# Patient Record
Sex: FEMALE | Race: AMERICAN INDIAN OR ALASKA NATIVE
[De-identification: names, ages, dates, MRNs, and addresses within clinical notes are randomized per-mention and may not be internally consistent; named-entity substitution may affect disease eponyms.]

---

## 2018-05-17 ENCOUNTER — HOSPITAL ENCOUNTER (EMERGENCY)
Dept: HOSPITAL 5 - ED | Age: 38
Discharge: HOME | End: 2018-05-17
Payer: MEDICAID

## 2018-05-17 VITALS — SYSTOLIC BLOOD PRESSURE: 118 MMHG | DIASTOLIC BLOOD PRESSURE: 72 MMHG
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
## 2018-05-17 DIAGNOSIS — S46.911A: Primary | ICD-10-CM

## 2018-05-17 PROCEDURE — 96372 THER/PROPH/DIAG INJ SC/IM: CPT

## 2018-05-17 PROCEDURE — 73030 X-RAY EXAM OF SHOULDER: CPT

## 2018-05-17 PROCEDURE — 99283 EMERGENCY DEPT VISIT LOW MDM: CPT

## 2018-05-17 PROCEDURE — 29105 APPLICATION LONG ARM SPLINT: CPT

## 2018-05-17 NOTE — EMERGENCY DEPARTMENT REPORT
ED Upper Extremity Inj HPI





- General


Chief Complaint: Shoulder Injury


Stated Complaint: C/O DISLOCATED SHOULDER


Time Seen by Provider: 05/17/18 20:06


Source: patient


Mode of arrival: Ambulatory


Limitations: No Limitations





- History of Present Illness


Initial Comments: 





This is a 38-year-old female nontoxic, well nourished in appearance, no acute 

signs of distress presents to the ED with c/o of right shoulder pain.  Patient 

stated that she was doing yoga and was stretching that caused the pain 

immediately.  Patient denies any trauma to the shoulder. Patient denies any 

joint redness, joint swelling, fever, chills, nausea, vomiting, chest pain or 

shortness breath.  Patient denies abnormal or decreased gait.  Patient denies 

any allergies or PMH.


MD Complaint: Injury to:: right, shoulder


-: days(s) (1)


Other Extremity Injury: Shoulder: Right


Other Injuries: none


Severity scale (0 -10): 8


Improves With: immobilization


Worsens With: movement of extremity


Associated Symptoms: denies other symptoms.  denies: weakness, numbness, neck 

pain, suspects foreign body, nausea/vomiting, heard/felt popping sensat





- Related Data


 Previous Rx's











 Medication  Instructions  Recorded  Last Taken  Type


 


Cyclobenzaprine [Flexeril] 10 mg PO QHS PRN #7 tablet 05/17/18 Unknown Rx


 


Ibuprofen [Motrin] 600 mg PO Q8H PRN #30 tablet 05/17/18 Unknown Rx











 Allergies











Allergy/AdvReac Type Severity Reaction Status Date / Time


 


No Known Allergies Allergy   Unverified 01/07/15 16:00














ED Review of Systems


ROS: 


Stated complaint: C/O DISLOCATED SHOULDER


Other details as noted in HPI





Constitutional: denies: chills, fever


Eyes: denies: eye pain, eye discharge, vision change


ENT: denies: ear pain, throat pain


Respiratory: denies: cough, shortness of breath, wheezing


Cardiovascular: denies: chest pain, palpitations


Endocrine: no symptoms reported


Gastrointestinal: denies: abdominal pain, nausea, diarrhea


Genitourinary: denies: urgency, dysuria, discharge


Musculoskeletal: arthralgia.  denies: back pain, joint swelling


Skin: denies: rash, lesions


Neurological: denies: headache, weakness, paresthesias


Psychiatric: denies: anxiety, depression


Hematological/Lymphatic: denies: easy bleeding, easy bruising





ED Past Medical Hx





- Past Medical History


Previous Medical History?: No





- Surgical History


Past Surgical History?: Yes


Hx Breast Surgery: Yes (augmentation)





- Social History


Smoking Status: Never Smoker


Substance Use Type: None





- Medications


Home Medications: 


 Home Medications











 Medication  Instructions  Recorded  Confirmed  Last Taken  Type


 


Cyclobenzaprine [Flexeril] 10 mg PO QHS PRN #7 tablet 05/17/18  Unknown Rx


 


Ibuprofen [Motrin] 600 mg PO Q8H PRN #30 tablet 05/17/18  Unknown Rx














ED Physical Exam





- General


Limitations: No Limitations


General appearance: alert, in no apparent distress





- Head


Head exam: Present: atraumatic, normocephalic





- Eye


Eye exam: Present: normal appearance


Pupils: Present: normal accommodation





- ENT


ENT exam: Present: normal exam, mucous membranes moist





- Neck


Neck exam: Present: normal inspection, full ROM.  Absent: tenderness, 

meningismus, lymphadenopathy





- Respiratory


Respiratory exam: Present: normal lung sounds bilaterally.  Absent: respiratory 

distress, wheezes, rales, rhonchi, stridor





- Cardiovascular


Cardiovascular Exam: Present: regular rate, normal rhythm, normal heart sounds.

  Absent: irregular rhythm, systolic murmur, diastolic murmur, rubs, gallop





- GI/Abdominal


GI/Abdominal exam: Present: soft, normal bowel sounds





- Extremities Exam


Extremities exam: Present: normal inspection, full ROM, tenderness, normal 

capillary refill.  Absent: joint swelling





- Expanded Upper Extremity Exam


  ** Right


General: Present: normal inspection


Shoulder Exam: Present: normal inspection, full ROM, tenderness.  Absent: 

swelling, abrasion, laceration, ecchymosis, deformity, crepidus, dislocation, 

erythema, tenderness over AC joint


Upper Arm exam: Present: normal inspection, full ROM.  Absent: tenderness, 

swelling


Elbow exam: Present: normal inspection, full ROM.  Absent: tenderness, swelling


Forearm Wrist exam: Present: normal inspection, full ROM.  Absent: tenderness, 

swelling


Hand Wrist exam: Present: normal inspection, full ROM.  Absent: tenderness, 

swelling


Neuro motor exam: Present: wrist extension intact, thumb opposition intact, 

thumb IP flexion intact, thumb adduction intact, fingers 2-5 abduction intact


Neurosensory exam: Present: 2-point discrimination, radial nerve intact, ulnar 

nerve intact, median nerve intact


Vascular: Present: vascular compromise, normal capillary refill, radial pulse, 

brachial pulse, ulnar pulse





- Back Exam


Back exam: Present: normal inspection, full ROM.  Absent: tenderness, CVA 

tenderness (R), CVA tenderness (L), muscle spasm, paraspinal tenderness, 

vertebral tenderness, rash noted





- Neurological Exam


Neurological exam: Present: alert, oriented X3, normal gait





- Psychiatric


Psychiatric exam: Present: normal affect, normal mood





- Skin


Skin exam: Present: warm, dry, intact, normal color.  Absent: rash





ED Course


 Vital Signs











  05/17/18





  17:26


 


Temperature 97.5 F L


 


Pulse Rate 88


 


Respiratory 18





Rate 


 


Blood Pressure 112/77


 


O2 Sat by Pulse 99





Oximetry 














- Reevaluation(s)


Reevaluation #1: 





05/17/18 20:32


Patient is speaking in full sentences with no signs of distress noted.





ED Medical Decision Making





- Medical Decision Making





This is a 38-year-old female that presents with left shoulder strain.  Patient 

is stable and was examined by me.  I referred patient to an orthopedic doctor 

for further evaluation for possible MRI.  X-ray has been obtained and dictated 

by the radiologist.  Patient is notified of the x-ray report with noted by the 

patient.  Patient does have normal gait with no tenderness and no joint 

swelling.  No ecchymosis. no joint redness or swelling. Not warm to touch. No 

signs of cellulites present. Patient received a shoulder immobilizer.  Patient 

was instructed to RICE therapy.  Patient received toradol for pain.  Patient is 

discharged with Motrin and flexeril and was instructed not to operate any 

machinery while taking Flexeril due to possible drowsiness. At time of discharge

, the patient does not seem toxic or ill in appearance.  No acute signs of 

distress noted.  Patient agrees to discharge treatment plan of care.  No 

further questions noted by the patient.


Critical care attestation.: 


If time is entered above; I have spent that time in minutes in the direct care 

of this critically ill patient, excluding procedure time.








ED Disposition


Clinical Impression: 


Right shoulder strain


Qualifiers:


 Encounter type: initial encounter Qualified Code(s): S46.911A - Strain of 

unspecified muscle, fascia and tendon at shoulder and upper arm level, right arm

, initial encounter





Disposition: DC-01 TO HOME OR SELFCARE


Is pt being admited?: No


Does the pt Need Aspirin: No


Condition: Stable


Instructions:  Ibuprofen (By mouth), Cyclobenzaprine (By mouth), Rotator Cuff 

Injury (ED), RICE Therapy (ED)


Additional Instructions: 


Follow-up with your primary care/orthopedic doctor in 3-5 days or if symptoms 

worsen such as bladder or bowel stability, chest pain, short of breath, 

numbness or tingling sensation in extremities, headache, dizziness, visual 

changes, nausea vomiting, or abdominal pain, return back to emergency room as 

was possible.


Take ibuprofen and Flexeril as prescribed.  Do not operate heavy machinery 

while taking Flexeril due to sedation


Prescriptions: 


Cyclobenzaprine [Flexeril] 10 mg PO QHS PRN #7 tablet


 PRN Reason: Muscle Spasm


Ibuprofen [Motrin] 600 mg PO Q8H PRN #30 tablet


 PRN Reason: Pain


Referrals: 


PRIMARY CARE,MD [Primary Care Provider] - 3-5 Days


CHERRIE PLATA MD [Staff Physician] - 3-5 Days


[facility] [Outside] - 3-5 Days


[facility] [Outside] - 3-5 Days


RUSTY ELMORE MD [Staff Physician] - 3-5 Days

## 2018-05-17 NOTE — XRAY REPORT
FINAL REPORT



PROCEDURE:  Right shoulder. 



TECHNIQUE:  Three views.



HISTORY:  Shoulder pain after falling. 



COMPARISON:  No prior studies are available for comparison.



FINDINGS:  

The bones appear intact without fracture or dislocation. The

joint spaces appear normal. The soft tissues are unremarkable.



IMPRESSION:  

Normal study.

## 2019-01-01 ENCOUNTER — HOSPITAL ENCOUNTER (EMERGENCY)
Dept: HOSPITAL 5 - ED | Age: 39
Discharge: HOME | End: 2019-01-01
Payer: MEDICAID

## 2019-01-01 VITALS — SYSTOLIC BLOOD PRESSURE: 148 MMHG | DIASTOLIC BLOOD PRESSURE: 98 MMHG
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
## 2019-01-01 DIAGNOSIS — R51: ICD-10-CM
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
## 2019-01-01 DIAGNOSIS — S39.012A: ICD-10-CM
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
## 2019-01-01 DIAGNOSIS — F17.200: ICD-10-CM

## 2019-01-01 DIAGNOSIS — S16.1XXA: Primary | ICD-10-CM

## 2019-01-01 PROCEDURE — 72125 CT NECK SPINE W/O DYE: CPT

## 2019-01-01 PROCEDURE — 72100 X-RAY EXAM L-S SPINE 2/3 VWS: CPT

## 2019-01-01 PROCEDURE — 70450 CT HEAD/BRAIN W/O DYE: CPT

## 2019-01-01 NOTE — CAT SCAN REPORT
FINAL REPORT



EXAM:  CT CERVICAL SPINE WO CON



HISTORY:  fall headache 







COMPARISON:  None available. 



TECHNIQUE:  Axial images obtained through the cervical spine. Additional sagittal and coronal reforma
tted images were obtained.



FINDINGS:  

Straightening of the normal lordotic curvature of the cervical spine. Cervical vertebral body heights
 are preserved. No acute fracture or traumatic subluxation. Odontoid process, articular pillars and o
ccipital condyles are intact. No significant bony encroachment upon the canal or foramen. Mild scarri
ng at the lung apices. 



IMPRESSION:  

No acute fracture or subluxation of the cervical spine. There is straightening of the normal lordotic
 curvature which may relate to patient positioning or muscle spasm.

## 2019-01-01 NOTE — EMERGENCY DEPARTMENT REPORT
ED Fall HPI





- General


Chief Complaint: Back Pain/Injury


Stated Complaint: FALL/ARM PAIN


Time Seen by Provider: 19 19:28


Source: patient


Mode of arrival: Ambulatory


Limitations: No Limitations





- History of Present Illness


Initial Comments: 


Patient Z [place] female who presents status post ground level fall at 

[facility] there is no LOC patient is an inflammatory after incident

complains of generalized headache neck and low back pain now there is no 

numbness or tingling no paralysis decrease in bowel or bladder function states 

that he does need to be checked out incase of legal purposes/ states she just 

slipped on wet floor. 





MD Complaint: fall


Onset/Timin


-: days(s)


Fall From: standing


When Fall Occurred: # days PTA (2)


Fall Witnessed: yes, by family


Place Fall Occurred: other ([place])


Loss of Consciousness: none


Prolonged Down Time?: no


Symptoms Prior to Fall: none


Location: neck, back


Severity: moderate


Severity scale (0 -10): 4


Quality: aching


Context: tripped/slipped


Associated Symptoms: headache, neck pain.  denies: numbness, weakness, chest 

paint, shortness of breath, abdominal pain, hematuria, lightheaded, vertigo, 

confusion





- Related Data


                                  Previous Rx's











 Medication  Instructions  Recorded  Last Taken  Type


 


Cyclobenzaprine [Flexeril] 10 mg PO QHS PRN #7 tablet 18 Unknown Rx


 


Ibuprofen [Motrin] 600 mg PO Q8H PRN #30 tablet 18 Unknown Rx


 


Cyclobenzaprine [Flexeril] 10 mg PO BID PRN #20 tablet 19 Unknown Rx


 


Menthol/Camphor [Tiger Balm 1 applicatio TP QID PRN #1 tube 19 Unknown Rx





Ointment]    


 


Naproxen 500 mg PO BID PRN #30 tablet 19 Unknown Rx











                                    Allergies











Allergy/AdvReac Type Severity Reaction Status Date / Time


 


No Known Allergies Allergy   Unverified 01/07/15 16:00














ED Review of Systems


ROS: 


Stated complaint: FALL/ARM PAIN


Other details as noted in HPI





Constitutional: denies: chills, fever


Eyes: denies: eye pain, eye discharge, vision change


ENT: denies: ear pain, throat pain


Respiratory: denies: cough, shortness of breath, wheezing


Cardiovascular: denies: chest pain, palpitations


Endocrine: no symptoms reported


Gastrointestinal: denies: abdominal pain, nausea, diarrhea


Genitourinary: denies: urgency, dysuria, discharge


Musculoskeletal: back pain, other (neck pain )


Skin: denies: rash, lesions


Neurological: headache


Psychiatric: denies: anxiety, depression


Hematological/Lymphatic: denies: easy bleeding, easy bruising





ED Past Medical Hx





- Surgical History


Hx Breast Surgery: Yes (augmentation)





- Social History


Smoking Status: Current Every Day Smoker


Substance Use Type: None





- Medications


Home Medications: 


                                Home Medications











 Medication  Instructions  Recorded  Confirmed  Last Taken  Type


 


Cyclobenzaprine [Flexeril] 10 mg PO QHS PRN #7 tablet 18  Unknown Rx


 


Ibuprofen [Motrin] 600 mg PO Q8H PRN #30 tablet 18  Unknown Rx


 


Cyclobenzaprine [Flexeril] 10 mg PO BID PRN #20 tablet 19  Unknown Rx


 


Menthol/Camphor [Tiger Balm 1 applicatio TP QID PRN #1 tube 19  Unknown Rx





Ointment]     


 


Naproxen 500 mg PO BID PRN #30 tablet 19  Unknown Rx














ED Physical Exam





- General


Limitations: No Limitations


General appearance: alert, in no apparent distress





- Head


Head exam: Present: normocephalic, normal inspection





- Expanded Head Exam


  ** Expanded


Head exam: Absent: laceration, abrasion, contusion, hematoma, racoon eyes, 

salazar's sign, general tenderness, tenderness of temporal artery, CSF 

rhinorrhea, CSF otorrhea





- Eye


Eye exam: Present: normal appearance, PERRL, EOMI.  Absent: nystagmus, 

periorbital swelling, periorbital tenderness


Pupils: Present: normal accommodation





- ENT


ENT exam: Present: normal exam, mucous membranes moist, TM's normal bilaterally,

normal external ear exam





- Neck


Neck exam: Present: normal inspection, tenderness (bilat lateral neck muscle 

pain rom intact including chin to chest bilat shoulders and full neck extenison 

without restriction there is no posterior vertebral point tenderness no 

deformity no ecchymosis no swelling no ecchymosis), full ROM.  Absent: 

meningismus, lymphadenopathy, thyromegaly





- Expanded Neck Exam


  ** Expanded


Neck exam: Present: tenderness (as above ).  Absent: midline deformity, anterior

neck swelling, thyroid mass, carotid bruit, tracheal deviation





- Respiratory


Respiratory exam: Present: normal lung sounds bilaterally.  Absent: respiratory 

distress, stridor, chest wall tenderness





- Cardiovascular


Cardiovascular Exam: Present: regular rate, normal rhythm, normal heart sounds. 

Absent: systolic murmur, diastolic murmur, rubs, gallop





- GI/Abdominal


GI/Abdominal exam: Present: soft, normal bowel sounds





- Rectal


Rectal exam: Present: deferred





- Extremities Exam


Extremities exam: Present: normal inspection, full ROM, normal capillary refill.

 Absent: tenderness, pedal edema, joint swelling, calf tenderness





- Back Exam


Back exam: Present: normal inspection, full ROM, tenderness, muscle spasm.  

Absent: CVA tenderness (R), CVA tenderness (L), paraspinal tenderness, vertebral

tenderness, rash noted





- Expanded Back Exam


  ** Expanded


Back exam: Present: other (there is no posterior vertebral point tenderness  no 

weakness pos straight leg raise left, rom intact gait is steady ).  Absent: 

saddle anesthesia


Back exam: Positive Straight Leg Raise: Left, Negative Straight Leg Raising: 

Right





- Neurological Exam


Neurological exam: Present: alert, oriented X3, CN II-XII intact, normal gait, 

reflexes normal





- Expanded Neurological Exam


  ** Expanded


Patient oriented to: Present: person, place, time


Speech: Present: fluid speech


Cranial nerves: EOM's Intact: Normal, Gag Reflex: Normal, Tongue Deviation: 

Normal, Nystagmus: Normal, Facial Sensation: Normal


Cerebellar function: Finger to Nose: Normal, Heel to Shin: Normal, Romberg: 

Normal


Upper motor neuron: Rodo Neglect: Normal, Pronator Drift: Normal, Babinski Sign:

Normal, Sensory Extinction: Normal


Sensory exam: Upper Extremity Light Touch: Normal, Upper Extremity Temperature: 

Normal, UE 2 Point Discrimination: Normal, Lower Extremity Light Touch: Normal, 

Lower Extremity Temperature: Normal, LE 2 Point Discrimination: Normal


Motor strength exam: RUE: 5, LUE: 5, RLE: 5, LLE: 5


DTR: bicep (R): 2+, bicep (L): 2+, knee (R): 2+, knee (L): 2+, ankle (R): 2+, 

ankle (L): 2+


Best Eye Response ([place]): (4) open spontaneously


Best Motor Response ([place]): (6) obeys commands


Best Verbal Response ([place]): (5) oriented


[place] Total: 15





- Psychiatric


Psychiatric exam: Present: normal affect, normal mood





- Skin


Skin exam: Present: warm, dry, intact, normal color.  Absent: rash





ED Course


                                   Vital Signs











  19





  18:19


 


Temperature 98.7 F


 


Pulse Rate 78


 


Respiratory 18





Rate 


 


Blood Pressure 148/98


 


O2 Sat by Pulse 98





Oximetry 














ED Medical Decision Making





- Radiology Data


Radiology results: report reviewed, image reviewed


Findings


[address] [address] 





XRay Report 


Signed 





Patient: ABENA MCKEON MR#: [id number] 


: 1980 Acct:[id number] 


Age/Sex: 38 / F ADM Date: 19 


Loc: ED 


Attending Dr: 








Ordering Physician: ERICA MERCADO NP 


Date of Service: 19 


Procedure(s): XR spine lumbosacral 2-3V 


Accession Number(s): [id number] 





cc: ERICA MERCADO NP 





Fluoro Time In Minutes: 





FINAL REPORT 





EXAM: XR SPINE LUMBOSACRAL 2-3V 





HISTORY: low back pain s/p fall 





COMPARISON: None available. 





FINDINGS: 


Three views of the lumbar spine obtained. Lumbar vertebral body heights are 

preserved. Mild loss of


disc height L5-S1 level. Remaining disc heights are preserved. Pedicles are 

intact. No 


spondylolisthesis. 





IMPRESSION: 


Lumbar vertebral body heights preserved. Mild focal degenerative changes L5-S1 

level. 











Transcribed By: LMA 


Dictated By: YUNG ESPINOZA MD 


Electronically Authenticated By: YUNG ESPINOZA MD 


Signed Date/Time: 192 


Findings


[address] [address] 





Cat Scan Report 


Signed 





Patient: ABENA MCKEON MR#: [id number] 


: 1980 Acct:[id number] 


Age/Sex: 38 / F ADM Date: 19 


Loc: ED 


Attending Dr: 








Ordering Physician: ERICA MERCADO NP 


Date of Service: 19 


Procedure(s): CT head/brain wo con 


Accession Number(s): [id number] 





cc: ERICA MERCADO NP 








FINAL REPORT 





EXAM: CT HEAD/BRAIN WO CON 





HISTORY: fall headache 





COMPARISON: None available. 





TECHNIQUE: Axial images obtained skull base through vertex. 





FINDINGS: 


No acute intracranial hemorrhage, midline shift or pathologic extra axial fluid 

collection. 


Ventricles and cisterns are normal in size and configuration for the patient's 

age. Gray-white 


differentiation preserved. Calvarium grossly intact. Visualized ocular globes 

are grossly 


unremarkable. Visualized para-nasal sinuses and mastoid air cells are clear. 





IMPRESSION: 


No grossly acute intracranial abnormality. 











Transcribed By: LMA 


Dictated By: YUNG ESPINOZA MD 


Electronically Authenticated By: YUNG ESPINOZA MD 


Signed Date/Time: 19 2014 








Findings


[facility] 


[address] [place] [address] 





Cat Scan Report 


Signed 





Patient: ABENA MCKEON MR#: [id number] 


: 1980 Acct:[id number] 


Age/Sex: 38 / F ADM Date: 19 


Loc: ED 


Attending Dr: 








Ordering Physician: ERICA MERCADO NP 


Date of Service: 19 


Procedure(s): CT cervical spine wo con 


Accession Number(s): [id number] 





cc: ERICA MERCADO NP 








FINAL REPORT 





EXAM: CT CERVICAL SPINE WO CON 





HISTORY: fall headache 











COMPARISON: None available. 





TECHNIQUE: Axial images obtained through the cervical spine. Additional sagittal

and coronal 


reformatted images were obtained. 





FINDINGS: 


Straightening of the normal lordotic curvature of the cervical spine. Cervical 

vertebral body 


heights are preserved. No acute fracture or traumatic subluxation. Odontoid 

process, articular 


pillars and occipital condyles are intact. No significant bony encroachment upon

the canal or 


foramen. Mild scarring at the lung apices. 





IMPRESSION: 


No acute fracture or subluxation of the cervical spine. There is straightening 

of the normal 


lordotic curvature which may relate to patient positioning or muscle spasm. 











Transcribed By: LMA 


Dictated By: YUNG ESPINOZA MD 


Electronically Authenticated By: YUNG ESPINOZA MD 


Signed Date/Time: 19 











DD/DT: 19 


TD/TT: 19





- Medical Decision Making


 Plan as improved CT of the head and C-spine are normal no fracture no soft 

tissue abnormality, lumbar x-rays are normal no fracture no soft tissue 

abnormality, mild L5S1 changes, -plan: dx: fall neck strain, low back strain, tx

with naids muscle relaxants and analgesic balm , pt will follow up with pcp in 2

days follow up with orthopedic surgery as directed, pt verbalized agreement and 

understanding  of discharge plan.  pt for dc to home in stable condition at this

time. 





Critical care attestation.: 


If time is entered above; I have spent that time in minutes in the direct care 

of this critically ill patient, excluding procedure time.








ED Disposition


Clinical Impression: 


Neck muscle strain


Qualifiers:


 Encounter type: initial encounter Qualified Code(s): S16.1XXA - Strain of 

muscle, fascia and tendon at neck level, initial encounter





Lumbar strain


Qualifiers:


 Encounter type: initial encounter Qualified Code(s): S39.012A - Strain of 

muscle, fascia and tendon of lower back, initial encounter





Fall


Qualifiers:


 Encounter type: initial encounter Qualified Code(s): W19.XXXA - Unspecified 

fall, initial encounter





Disposition: DC-01 TO HOME OR SELFCARE


Is pt being admited?: No


Does the pt Need Aspirin: No


Condition: Stable


Instructions:  Neck Exercises (GEN), Cervical Sprain (ED), Low Back Strain (ED),

Core Strengthening Exercises (GEN)


Prescriptions: 


Cyclobenzaprine [Flexeril] 10 mg PO BID PRN #20 tablet


 PRN Reason: Muscle Spasm


Menthol/Camphor [Tiger Balm Ointment] 1 applicatio TP QID PRN #1 tube


 PRN Reason: pain


Naproxen 500 mg PO BID PRN #30 tablet


 PRN Reason: pain


Referrals: 


[facility] [Outside] - 3-5 Days


RUSTY ELMORE MD [Staff Physician] - 3-5 Days


Forms:  Work/School Release Form(ED)


Time of Disposition: 21:43

## 2019-01-01 NOTE — XRAY REPORT
FINAL REPORT



EXAM:  XR SPINE LUMBOSACRAL 2-3V



HISTORY:  low back pain s/p fall 



COMPARISON:  None available. 



FINDINGS:  

Three views of the lumbar spine obtained. Lumbar vertebral body heights are preserved. Mild loss of d
isc height L5-S1 level. Remaining disc heights are preserved. Pedicles are intact. No spondylolisthes
is. 



IMPRESSION:  

Lumbar vertebral body heights preserved. Mild focal degenerative changes L5-S1 level.

## 2019-01-01 NOTE — CAT SCAN REPORT
FINAL REPORT



EXAM:  CT HEAD/BRAIN WO CON



HISTORY:  fall headache 



COMPARISON:  None available. 



TECHNIQUE:  Axial images obtained skull base through vertex.



FINDINGS:  

No acute intracranial hemorrhage, midline shift or pathologic extra axial fluid collection. Ventricle
s and cisterns are normal in size and configuration for the patient's age. Gray-white differentiation
 preserved. Calvarium grossly intact. Visualized ocular globes are grossly unremarkable. Visualized p
mark-nasal sinuses and mastoid air cells are clear.



IMPRESSION:  

No grossly acute intracranial abnormality.